# Patient Record
Sex: FEMALE | Race: ASIAN | NOT HISPANIC OR LATINO | URBAN - NONMETROPOLITAN AREA
[De-identification: names, ages, dates, MRNs, and addresses within clinical notes are randomized per-mention and may not be internally consistent; named-entity substitution may affect disease eponyms.]

---

## 2019-03-14 ENCOUNTER — IMPORTED ENCOUNTER (OUTPATIENT)
Dept: URBAN - NONMETROPOLITAN AREA CLINIC 1 | Facility: CLINIC | Age: 34
End: 2019-03-14

## 2019-03-14 PROBLEM — T15.11XA: Noted: 2019-03-14

## 2019-03-14 PROCEDURE — 99203 OFFICE O/P NEW LOW 30 MIN: CPT

## 2019-03-14 PROCEDURE — 65205 REMOVE FOREIGN BODY FROM EYE: CPT

## 2019-03-14 NOTE — PATIENT DISCUSSION
Conjunctival FB-  discussed findings w/patient-  removed at [facility] w/Qtip-  start Maxitrol QID OD x 5day then d/c written Rx issued-  monitor as needed

## 2022-04-15 ASSESSMENT — VISUAL ACUITY
OS_SC: 20/30
OD_SC: 20/30